# Patient Record
Sex: MALE | Race: WHITE | NOT HISPANIC OR LATINO | Employment: UNEMPLOYED | URBAN - NONMETROPOLITAN AREA
[De-identification: names, ages, dates, MRNs, and addresses within clinical notes are randomized per-mention and may not be internally consistent; named-entity substitution may affect disease eponyms.]

---

## 2022-03-05 ENCOUNTER — HOSPITAL ENCOUNTER (EMERGENCY)
Facility: HOSPITAL | Age: 22
Discharge: PSYCHIATRIC OR SUBSTANCE USE FACILITY | End: 2022-03-06
Attending: EMERGENCY MEDICINE | Admitting: STUDENT IN AN ORGANIZED HEALTH CARE EDUCATION/TRAINING PROGRAM

## 2022-03-05 ENCOUNTER — APPOINTMENT (OUTPATIENT)
Dept: GENERAL RADIOLOGY | Facility: HOSPITAL | Age: 22
End: 2022-03-05

## 2022-03-05 DIAGNOSIS — F32.A DEPRESSION WITH SUICIDAL IDEATION: ICD-10-CM

## 2022-03-05 DIAGNOSIS — S51.812A LACERATION OF LEFT FOREARM, INITIAL ENCOUNTER: Primary | ICD-10-CM

## 2022-03-05 DIAGNOSIS — R45.851 DEPRESSION WITH SUICIDAL IDEATION: ICD-10-CM

## 2022-03-05 LAB
BASOPHILS # BLD AUTO: 0.13 10*3/MM3 (ref 0–0.2)
BASOPHILS NFR BLD AUTO: 0.9 % (ref 0–1.5)
DEPRECATED RDW RBC AUTO: 44 FL (ref 37–54)
EOSINOPHIL # BLD AUTO: 0.42 10*3/MM3 (ref 0–0.4)
EOSINOPHIL NFR BLD AUTO: 3.1 % (ref 0.3–6.2)
ERYTHROCYTE [DISTWIDTH] IN BLOOD BY AUTOMATED COUNT: 13.8 % (ref 12.3–15.4)
HCT VFR BLD AUTO: 49.5 % (ref 37.5–51)
HGB BLD-MCNC: 17.1 G/DL (ref 13–17.7)
IMM GRANULOCYTES # BLD AUTO: 0.07 10*3/MM3 (ref 0–0.05)
IMM GRANULOCYTES NFR BLD AUTO: 0.5 % (ref 0–0.5)
LYMPHOCYTES # BLD AUTO: 2.36 10*3/MM3 (ref 0.7–3.1)
LYMPHOCYTES NFR BLD AUTO: 17.2 % (ref 19.6–45.3)
MCH RBC QN AUTO: 30.3 PG (ref 26.6–33)
MCHC RBC AUTO-ENTMCNC: 34.5 G/DL (ref 31.5–35.7)
MCV RBC AUTO: 87.6 FL (ref 79–97)
MONOCYTES # BLD AUTO: 0.91 10*3/MM3 (ref 0.1–0.9)
MONOCYTES NFR BLD AUTO: 6.6 % (ref 5–12)
NEUTROPHILS NFR BLD AUTO: 71.7 % (ref 42.7–76)
NEUTROPHILS NFR BLD AUTO: 9.84 10*3/MM3 (ref 1.7–7)
NRBC BLD AUTO-RTO: 0 /100 WBC (ref 0–0.2)
PLATELET # BLD AUTO: 327 10*3/MM3 (ref 140–450)
PMV BLD AUTO: 10.1 FL (ref 6–12)
RBC # BLD AUTO: 5.65 10*6/MM3 (ref 4.14–5.8)
WBC NRBC COR # BLD: 13.73 10*3/MM3 (ref 3.4–10.8)

## 2022-03-05 PROCEDURE — 90715 TDAP VACCINE 7 YRS/> IM: CPT | Performed by: EMERGENCY MEDICINE

## 2022-03-05 PROCEDURE — 80179 DRUG ASSAY SALICYLATE: CPT | Performed by: EMERGENCY MEDICINE

## 2022-03-05 PROCEDURE — 25010000002 TETANUS-DIPHTH-ACELL PERTUSSIS 5-2.5-18.5 LF-MCG/0.5 SUSPENSION PREFILLED SYRINGE: Performed by: EMERGENCY MEDICINE

## 2022-03-05 PROCEDURE — 80050 GENERAL HEALTH PANEL: CPT | Performed by: EMERGENCY MEDICINE

## 2022-03-05 PROCEDURE — 36415 COLL VENOUS BLD VENIPUNCTURE: CPT

## 2022-03-05 PROCEDURE — 80306 DRUG TEST PRSMV INSTRMNT: CPT | Performed by: EMERGENCY MEDICINE

## 2022-03-05 PROCEDURE — 99285 EMERGENCY DEPT VISIT HI MDM: CPT

## 2022-03-05 PROCEDURE — 84439 ASSAY OF FREE THYROXINE: CPT | Performed by: PSYCHIATRY & NEUROLOGY

## 2022-03-05 PROCEDURE — 82077 ASSAY SPEC XCP UR&BREATH IA: CPT | Performed by: EMERGENCY MEDICINE

## 2022-03-05 PROCEDURE — 80143 DRUG ASSAY ACETAMINOPHEN: CPT | Performed by: EMERGENCY MEDICINE

## 2022-03-05 PROCEDURE — 73090 X-RAY EXAM OF FOREARM: CPT

## 2022-03-05 PROCEDURE — 87636 SARSCOV2 & INF A&B AMP PRB: CPT | Performed by: EMERGENCY MEDICINE

## 2022-03-05 PROCEDURE — 81001 URINALYSIS AUTO W/SCOPE: CPT | Performed by: EMERGENCY MEDICINE

## 2022-03-05 PROCEDURE — 90471 IMMUNIZATION ADMIN: CPT | Performed by: EMERGENCY MEDICINE

## 2022-03-05 RX ORDER — LIDOCAINE HYDROCHLORIDE AND EPINEPHRINE 10; 10 MG/ML; UG/ML
10 INJECTION, SOLUTION INFILTRATION; PERINEURAL ONCE
Status: COMPLETED | OUTPATIENT
Start: 2022-03-05 | End: 2022-03-05

## 2022-03-05 RX ORDER — DIAPER,BRIEF,INFANT-TODD,DISP
1 EACH MISCELLANEOUS ONCE
Status: COMPLETED | OUTPATIENT
Start: 2022-03-05 | End: 2022-03-06

## 2022-03-05 RX ADMIN — LIDOCAINE HYDROCHLORIDE AND EPINEPHRINE 10 ML: 10; 10 INJECTION, SOLUTION INFILTRATION; PERINEURAL at 23:49

## 2022-03-05 RX ADMIN — TETANUS TOXOID, REDUCED DIPHTHERIA TOXOID AND ACELLULAR PERTUSSIS VACCINE, ADSORBED 0.5 ML: 5; 2.5; 8; 8; 2.5 SUSPENSION INTRAMUSCULAR at 23:05

## 2022-03-06 ENCOUNTER — HOSPITAL ENCOUNTER (INPATIENT)
Facility: HOSPITAL | Age: 22
LOS: 5 days | Discharge: HOME | End: 2022-03-11
Attending: PSYCHIATRY & NEUROLOGY | Admitting: PSYCHIATRY & NEUROLOGY

## 2022-03-06 VITALS
DIASTOLIC BLOOD PRESSURE: 77 MMHG | BODY MASS INDEX: 25.11 KG/M2 | WEIGHT: 160 LBS | HEIGHT: 67 IN | OXYGEN SATURATION: 97 % | HEART RATE: 102 BPM | RESPIRATION RATE: 16 BRPM | TEMPERATURE: 96.9 F | SYSTOLIC BLOOD PRESSURE: 127 MMHG

## 2022-03-06 PROBLEM — F32.A DEPRESSION: Status: ACTIVE | Noted: 2022-03-06

## 2022-03-06 PROBLEM — F60.3 BORDERLINE PERSONALITY DISORDER: Status: ACTIVE | Noted: 2022-03-06

## 2022-03-06 PROBLEM — F43.10 POST TRAUMATIC STRESS DISORDER (PTSD): Status: ACTIVE | Noted: 2022-03-06

## 2022-03-06 PROBLEM — F39 AFFECTIVE DISORDER: Status: ACTIVE | Noted: 2022-03-06

## 2022-03-06 PROBLEM — Z72.89 SELF-INJURIOUS BEHAVIOR: Status: ACTIVE | Noted: 2022-03-06

## 2022-03-06 LAB
ALBUMIN SERPL-MCNC: 5.1 G/DL (ref 3.5–5.2)
ALBUMIN/GLOB SERPL: 1.9 G/DL
ALP SERPL-CCNC: 105 U/L (ref 39–117)
ALT SERPL W P-5'-P-CCNC: 16 U/L (ref 1–41)
AMPHET+METHAMPHET UR QL: POSITIVE
AMPHETAMINES UR QL: POSITIVE
ANION GAP SERPL CALCULATED.3IONS-SCNC: 14 MMOL/L (ref 5–15)
APAP SERPL-MCNC: <5 MCG/ML (ref 0–30)
AST SERPL-CCNC: 22 U/L (ref 1–40)
BACTERIA UR QL AUTO: ABNORMAL /HPF
BARBITURATES UR QL SCN: NEGATIVE
BENZODIAZ UR QL SCN: NEGATIVE
BILIRUB SERPL-MCNC: 0.4 MG/DL (ref 0–1.2)
BILIRUB UR QL STRIP: NEGATIVE
BUN SERPL-MCNC: 12 MG/DL (ref 6–20)
BUN/CREAT SERPL: 10.6 (ref 7–25)
BUPRENORPHINE SERPL-MCNC: NEGATIVE NG/ML
CALCIUM SPEC-SCNC: 9.3 MG/DL (ref 8.6–10.5)
CANNABINOIDS SERPL QL: POSITIVE
CHLORIDE SERPL-SCNC: 104 MMOL/L (ref 98–107)
CLARITY UR: ABNORMAL
CO2 SERPL-SCNC: 24 MMOL/L (ref 22–29)
COCAINE UR QL: NEGATIVE
COLOR UR: YELLOW
CREAT SERPL-MCNC: 1.13 MG/DL (ref 0.76–1.27)
EGFRCR SERPLBLD CKD-EPI 2021: 94.8 ML/MIN/1.73
ETHANOL BLD-MCNC: 81 MG/DL (ref 0–10)
ETHANOL UR QL: 0.08 %
FLUAV RNA RESP QL NAA+PROBE: NOT DETECTED
FLUBV RNA RESP QL NAA+PROBE: NOT DETECTED
GLOBULIN UR ELPH-MCNC: 2.7 GM/DL
GLUCOSE SERPL-MCNC: 60 MG/DL (ref 65–99)
GLUCOSE UR STRIP-MCNC: NEGATIVE MG/DL
HGB UR QL STRIP.AUTO: NEGATIVE
HYALINE CASTS UR QL AUTO: ABNORMAL /LPF
KETONES UR QL STRIP: ABNORMAL
LEUKOCYTE ESTERASE UR QL STRIP.AUTO: ABNORMAL
METHADONE UR QL SCN: NEGATIVE
NITRITE UR QL STRIP: NEGATIVE
OPIATES UR QL: NEGATIVE
OXYCODONE UR QL SCN: NEGATIVE
PCP UR QL SCN: NEGATIVE
PH UR STRIP.AUTO: 6 [PH] (ref 5–9)
POTASSIUM SERPL-SCNC: 3.6 MMOL/L (ref 3.5–5.2)
PROPOXYPH UR QL: NEGATIVE
PROT SERPL-MCNC: 7.8 G/DL (ref 6–8.5)
PROT UR QL STRIP: ABNORMAL
RBC # UR STRIP: ABNORMAL /HPF
REF LAB TEST METHOD: ABNORMAL
SALICYLATES SERPL-MCNC: <0.3 MG/DL
SARS-COV-2 RNA RESP QL NAA+PROBE: NOT DETECTED
SODIUM SERPL-SCNC: 142 MMOL/L (ref 136–145)
SP GR UR STRIP: 1.02 (ref 1–1.03)
SQUAMOUS #/AREA URNS HPF: ABNORMAL /HPF
T4 FREE SERPL-MCNC: 1.61 NG/DL (ref 0.93–1.7)
TRICYCLICS UR QL SCN: NEGATIVE
TSH SERPL DL<=0.05 MIU/L-ACNC: 1.37 UIU/ML (ref 0.27–4.2)
UROBILINOGEN UR QL STRIP: ABNORMAL
WBC # UR STRIP: ABNORMAL /HPF

## 2022-03-06 PROCEDURE — 90792 PSYCH DIAG EVAL W/MED SRVCS: CPT | Performed by: PSYCHIATRY & NEUROLOGY

## 2022-03-06 PROCEDURE — 93005 ELECTROCARDIOGRAM TRACING: CPT | Performed by: STUDENT IN AN ORGANIZED HEALTH CARE EDUCATION/TRAINING PROGRAM

## 2022-03-06 PROCEDURE — 93010 ELECTROCARDIOGRAM REPORT: CPT | Performed by: INTERNAL MEDICINE

## 2022-03-06 RX ORDER — LOPERAMIDE HYDROCHLORIDE 2 MG/1
2 CAPSULE ORAL
Status: DISCONTINUED | OUTPATIENT
Start: 2022-03-06 | End: 2022-03-11 | Stop reason: HOSPADM

## 2022-03-06 RX ORDER — ARIPIPRAZOLE 2 MG/1
2 TABLET ORAL DAILY
Status: COMPLETED | OUTPATIENT
Start: 2022-03-06 | End: 2022-03-06

## 2022-03-06 RX ORDER — CLONIDINE HYDROCHLORIDE 0.1 MG/1
0.1 TABLET ORAL EVERY 6 HOURS PRN
Status: DISCONTINUED | OUTPATIENT
Start: 2022-03-06 | End: 2022-03-11 | Stop reason: HOSPADM

## 2022-03-06 RX ORDER — OLANZAPINE 5 MG/1
10 TABLET, ORALLY DISINTEGRATING ORAL ONCE
Status: COMPLETED | OUTPATIENT
Start: 2022-03-06 | End: 2022-03-06

## 2022-03-06 RX ORDER — HYDROXYZINE PAMOATE 50 MG/1
50 CAPSULE ORAL EVERY 6 HOURS PRN
Status: DISCONTINUED | OUTPATIENT
Start: 2022-03-06 | End: 2022-03-11 | Stop reason: HOSPADM

## 2022-03-06 RX ORDER — ALUMINA, MAGNESIA, AND SIMETHICONE 2400; 2400; 240 MG/30ML; MG/30ML; MG/30ML
15 SUSPENSION ORAL EVERY 6 HOURS PRN
Status: DISCONTINUED | OUTPATIENT
Start: 2022-03-06 | End: 2022-03-11 | Stop reason: HOSPADM

## 2022-03-06 RX ORDER — TRAZODONE HYDROCHLORIDE 50 MG/1
50 TABLET ORAL NIGHTLY PRN
Status: DISCONTINUED | OUTPATIENT
Start: 2022-03-06 | End: 2022-03-11 | Stop reason: HOSPADM

## 2022-03-06 RX ORDER — ACETAMINOPHEN 325 MG/1
650 TABLET ORAL EVERY 4 HOURS PRN
Status: DISCONTINUED | OUTPATIENT
Start: 2022-03-06 | End: 2022-03-11 | Stop reason: HOSPADM

## 2022-03-06 RX ORDER — SERTRALINE HYDROCHLORIDE 25 MG/1
25 TABLET, FILM COATED ORAL DAILY
Status: COMPLETED | OUTPATIENT
Start: 2022-03-06 | End: 2022-03-06

## 2022-03-06 RX ORDER — ARIPIPRAZOLE 5 MG/1
5 TABLET ORAL DAILY
Status: DISCONTINUED | OUTPATIENT
Start: 2022-03-07 | End: 2022-03-07

## 2022-03-06 RX ADMIN — TRAZODONE HYDROCHLORIDE 50 MG: 50 TABLET ORAL at 20:09

## 2022-03-06 RX ADMIN — ARIPIPRAZOLE 2 MG: 2 TABLET ORAL at 18:08

## 2022-03-06 RX ADMIN — BACITRACIN 1 APPLICATION: 500 OINTMENT TOPICAL at 00:28

## 2022-03-06 RX ADMIN — OLANZAPINE 10 MG: 5 TABLET, ORALLY DISINTEGRATING ORAL at 03:06

## 2022-03-06 RX ADMIN — SERTRALINE HYDROCHLORIDE 25 MG: 25 TABLET ORAL at 18:09
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
## 2022-03-06 NOTE — PLAN OF CARE
Goal Outcome Evaluation:  Plan of Care Reviewed With: patient  Patient Agreement with Plan of Care: agrees     Progress: no change  Outcome Evaluation: Patient has been sleeping majority of the day. Has been staying in his room. No issues voiced at this time.
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
## 2022-03-06 NOTE — CONSULTS
[facility] Medicine Consult  Inpatient Hospitalist Consult  Consult performed by: Fred Lui APRN  Consult ordered by: Lavelle Lisa MD          Date of Admission: 3/6/2022  Date of Consult: 03/06/22    Primary Care Physician: Provider, No Known  Referring Physician:  Lavelle Lisa MD      Chief Complaint/Reason for Consultation: Medical co-management, depression    HPI:  This is a 22 year old male with a history of depression who is admitted for depression.  He denies any acute complaints.     Past Medical History:   Past Medical History:   Diagnosis Date   • Asthma    • Depression    • Substance abuse (HCC)    • Suicide attempt (HCC)        Past Surgical History: History reviewed. No pertinent surgical history.  Denies any surgical history.     Family History: Negative for cancer, CVA, or MI.     Social History:   Social History     Socioeconomic History   • Marital status: Single   Tobacco Use   • Smoking status: Current Every Day Smoker     Packs/day: 0.50     Types: Cigarettes   Substance and Sexual Activity   • Alcohol use: Yes   • Drug use: Yes     Types: Methamphetamines, Marijuana   • Sexual activity: Defer       Allergies: No Known Allergies    Medications:   Current Facility-Administered Medications:   •  acetaminophen (TYLENOL) tablet 650 mg, 650 mg, Oral, Q4H PRN, Lavelle Lisa MD  •  aluminum-magnesium hydroxide-simethicone (MAALOX MAX) 400-400-40 MG/5ML suspension 15 mL, 15 mL, Oral, Q6H PRN, Lavelle Lisa MD  •  cloNIDine (CATAPRES) tablet 0.1 mg, 0.1 mg, Oral, Q6H PRN, Lavelle Lisa MD  •  hydrOXYzine pamoate (VISTARIL) capsule 50 mg, 50 mg, Oral, Q6H PRN, Lavelle Lisa MD  •  influenza vac split quad (FLUZONE,FLUARIX,AFLURIA,FLULAVAL) injection 0.5 mL, 0.5 mL, Intramuscular, During Hospitalization, Lavelle Lisa MD  •  loperamide (IMODIUM) capsule 2 mg, 2 mg, Oral, Q2H PRN, Sloan  MD Lavelle  •  magnesium hydroxide (MILK OF MAGNESIA) suspension 10 mL, 10 mL, Oral, Daily PRN, Lavelle Lisa MD  •  nicotine polacrilex (NICORETTE) gum 2 mg, 2 mg, Mouth/Throat, Q1H PRN, Lavelle Lisa MD  •  traZODone (DESYREL) tablet 50 mg, 50 mg, Oral, Nightly PRN, Lavelle Lisa MD    Review of Systems:  Review of Systems   Constitutional: Negative for appetite change, chills, fatigue and fever.   HENT: Negative for congestion.    Eyes: Negative for visual disturbance.   Respiratory: Negative for cough, chest tightness, shortness of breath and wheezing.    Cardiovascular: Negative for chest pain, palpitations and leg swelling.   Gastrointestinal: Negative for abdominal distention, abdominal pain, diarrhea, nausea and vomiting.   Genitourinary: Negative for difficulty urinating and dysuria.   Musculoskeletal: Negative for arthralgias, back pain, myalgias and neck pain.   Skin: Negative for rash and wound.   Neurological: Negative for dizziness, syncope, weakness, light-headedness, numbness and headaches.   All other systems reviewed and are negative.     Otherwise complete ROS is negative except as mentioned above.    Physical Exam:   Temp:  [96.9 °F (36.1 °C)-98.1 °F (36.7 °C)] 97.5 °F (36.4 °C)  Heart Rate:  [] 98  Resp:  [16-20] 20  BP: (125-151)/(73-96) 131/73  Physical Exam  Vitals reviewed.   Constitutional:       General: He is not in acute distress.     Appearance: Normal appearance. He is well-developed. He is not diaphoretic.   HENT:      Head: Normocephalic and atraumatic.   Eyes:      Conjunctiva/sclera: Conjunctivae normal.   Cardiovascular:      Rate and Rhythm: Normal rate and regular rhythm.      Heart sounds: No murmur heard.    No friction rub. No gallop.   Pulmonary:      Effort: Pulmonary effort is normal. No respiratory distress.      Breath sounds: Normal breath sounds. No wheezing or rales.   Chest:      Chest wall: No tenderness.   Abdominal:      General: Bowel  sounds are normal. There is no distension.      Palpations: Abdomen is soft.      Tenderness: There is no abdominal tenderness.   Musculoskeletal:         General: No swelling or deformity.      Cervical back: Normal range of motion and neck supple.   Skin:     General: Skin is warm and dry.      Findings: No erythema.   Neurological:      General: No focal deficit present.      Mental Status: He is alert and oriented to person, place, and time.      Comments: CN I: Sense of smell intact  CN II: Visual fields intact  CN III,IV,VI: extraocular movements intact  CN V: [place] strength and sensation in all three divisions intact  CN VII: Smile and eyelid closure symmetrical  CN VIII: Hearing intact  CN IX and X: Voice and palate movement intact  CN XI: Shoulder shrug intact  CN XII: Tongue protrusion and movement intact             Results Reviewed:  I have personally reviewed current lab, radiology, and data and agree with results.  Lab Results (last 24 hours)     ** No results found for the last 24 hours. **        Imaging Results (Last 24 Hours)     ** No results found for the last 24 hours. **          Assessment:    Depression            Recommendations:  1. Psychiatric management per primary team    Will sign off.  Please do not hesitate to call with questions or changes in the patients condition. Thank you.    I confirmed that the patient's Advance Care Plan is present, code status is documented, or surrogate decision maker is listed in the patient's medical record.     I have utilized all available immediate resources to obtain, update, or review the patient's current medications.     Fred Lui, APRN  03/06/22  11:32 CST

## 2022-03-06 NOTE — ED PROVIDER NOTES
Subjective   21yo male presents ED via EMS c/o suicidal thoughts with intentional laceration volar left forearm with hunting knife prior to arrival.  Pt denies HI/AV hallucination.  Pt endorses etoh/illicit drug use.  ROS otherwise unremarkable.      History provided by:  Patient  Mental Health Problem  Presenting symptoms: depression, self-mutilation and suicidal thoughts    Presenting symptoms: no hallucinations and no homicidal ideas        Review of Systems   Unable to perform ROS: Psychiatric disorder   Constitutional: Negative.    HENT: Negative.    Respiratory: Negative.    Cardiovascular: Negative.    Skin: Positive for wound.   Psychiatric/Behavioral: Positive for self-injury and suicidal ideas. Negative for hallucinations and homicidal ideas.       Past Medical History:   Diagnosis Date   • Asthma        No Known Allergies    History reviewed. No pertinent surgical history.    History reviewed. No pertinent family history.    Social History     Socioeconomic History   • Marital status: Single   Tobacco Use   • Smoking status: Current Every Day Smoker     Packs/day: 0.50     Types: Cigarettes   Substance and Sexual Activity   • Alcohol use: Yes   • Drug use: Not Currently           Objective   Physical Exam  Vitals and nursing note reviewed.   Constitutional:       Appearance: Normal appearance.   HENT:      Head: Normocephalic and atraumatic.      Mouth/Throat:      Mouth: Mucous membranes are moist.   Eyes:      Pupils: Pupils are equal, round, and reactive to light.   Cardiovascular:      Rate and Rhythm: Normal rate and regular rhythm.      Pulses: Normal pulses.      Heart sounds: Normal heart sounds. No murmur heard.    No friction rub. No gallop.   Pulmonary:      Effort: Pulmonary effort is normal. No respiratory distress.      Breath sounds: Normal breath sounds. No wheezing, rhonchi or rales.   Abdominal:      General: Abdomen is flat. Bowel sounds are normal. There is no distension.       Palpations: Abdomen is soft.      Tenderness: There is no abdominal tenderness. There is no guarding or rebound.   Musculoskeletal:        Arms:       Cervical back: Normal range of motion and neck supple. No rigidity.   Lymphadenopathy:      Cervical: No cervical adenopathy.   Neurological:      General: No focal deficit present.      Mental Status: He is alert and oriented to person, place, and time.      GCS: GCS eye subscore is 4. GCS verbal subscore is 5. GCS motor subscore is 6.   Psychiatric:         Attention and Perception: Attention normal.         Mood and Affect: Mood is depressed.         Speech: Speech normal.         Behavior: Behavior is cooperative.         Thought Content: Thought content includes suicidal ideation. Thought content does not include homicidal ideation. Thought content includes suicidal plan.         Cognition and Memory: Cognition normal.         Judgment: Judgment is impulsive and inappropriate.         Laceration Repair    Date/Time: 3/5/2022 11:01 PM  Performed by: Hoang Maldonado MD  Authorized by: Hoang Maldonado MD     Consent:     Consent obtained:  Verbal    Consent given by:  Patient  Anesthesia:     Anesthesia method:  Local infiltration    Local anesthetic:  Lidocaine 1% w/o epi  Laceration details:     Length (cm):  6  Exploration:     Wound exploration: entire depth of wound visualized      Wound extent: no nerve damage noted, no tendon damage noted, no underlying fracture noted and no vascular damage noted    Treatment:     Area cleansed with:  Chlorhexidine and saline    Amount of cleaning:  Standard    Debridement:  None  Skin repair:     Repair method:  Sutures    Suture size:  4-0    Suture material:  Nylon    Suture technique:  Running    Number of sutures:  11  Approximation:     Approximation:  Close  Post-procedure details:     Dressing:  Antibiotic ointment and bulky dressing    Procedure completion:  Tolerated well, no immediate complications    Laceration  Repair    Date/Time: 3/6/2022 12:00 AM  Performed by: Hoang Maldonado MD  Authorized by: Hoang Maldonado MD     Consent:     Consent obtained:  Verbal  Anesthesia:     Anesthesia method:  Local infiltration    Local anesthetic:  Lidocaine 1% WITH epi  Pre-procedure details:     Preparation:  Patient was prepped and draped in usual sterile fashion and imaging obtained to evaluate for foreign bodies  Exploration:     Wound exploration: entire depth of wound visualized      Wound extent: no nerve damage noted, no tendon damage noted, no underlying fracture noted and no vascular damage noted      Contaminated: no    Treatment:     Area cleansed with:  Saline and chlorhexidine    Amount of cleaning:  Standard    Irrigation solution:  Sterile saline    Debridement:  None  Skin repair:     Repair method:  Sutures    Suture size:  4-0    Suture material:  Nylon    Suture technique:  Running    Number of sutures:  9  Approximation:     Approximation:  Close  Repair type:     Repair type:  Simple  Post-procedure details:     Dressing:  Antibiotic ointment, bulky dressing and non-adherent dressing    Procedure completion:  Tolerated well, no immediate complications               ED Course  ED Course as of 03/06/22 0141   Sun Mar 06, 2022   0000 Checkout Dr. Carrizales. Pending labs/psych consult/disposition. [SD]   0141 Patient checked out to me by Dr. Maldonado.  Patient medically cleared.  Evaluated by psych.  Transfer to the behavioral health unit. [BH]      ED Course User Index  [BH] Rui Carrizales MD  [SD] Hoang Maldonado MD      Labs Reviewed   COMPREHENSIVE METABOLIC PANEL - Abnormal; Notable for the following components:       Result Value    Glucose 60 (*)     All other components within normal limits    Narrative:     GFR Normal >60  Chronic Kidney Disease <60  Kidney Failure <15     URINALYSIS W/ MICROSCOPIC IF INDICATED (NO CULTURE) - Abnormal; Notable for the following components:    Appearance, UA Cloudy (*)      Ketones, UA Trace (*)     Protein, UA 30 mg/dL (1+) (*)     Leuk Esterase, UA Small (1+) (*)     All other components within normal limits   ETHANOL - Abnormal; Notable for the following components:    Ethanol 81 (*)     All other components within normal limits   URINE DRUG SCREEN - Abnormal; Notable for the following components:    THC, Screen, Urine Positive (*)     Methamphetamine, Ur Positive (*)     Amphetamine Screen, Urine Positive (*)     All other components within normal limits    Narrative:     Cutoff For Drugs Screened:    Amphetamines               500 ng/ml  Barbiturates               200 ng/ml  Benzodiazepines            150 ng/ml  Cocaine                    150 ng/ml  Methadone                  200 ng/ml  Opiates                    100 ng/ml  Phencyclidine               25 ng/ml  THC                            50 ng/ml  Methamphetamine            500 ng/ml  Tricyclic Antidepressants  300 ng/ml  Oxycodone                  100 ng/ml  Propoxyphene               300 ng/ml  Buprenorphine               10 ng/ml    The normal value for all drugs tested is negative. This report includes unconfirmed screening results, with the cutoff values listed, to be used for medical treatment purposes only.  Unconfirmed results must not be used for non-medical purposes such as employment or legal testing.  Clinical consideration should be applied to any drug of abuse test, particularly when unconfirmed results are used.     CBC WITH AUTO DIFFERENTIAL - Abnormal; Notable for the following components:    WBC 13.73 (*)     Lymphocyte % 17.2 (*)     Neutrophils, Absolute 9.84 (*)     Monocytes, Absolute 0.91 (*)     Eosinophils, Absolute 0.42 (*)     Immature Grans, Absolute 0.07 (*)     All other components within normal limits   URINALYSIS, MICROSCOPIC ONLY - Abnormal; Notable for the following components:    RBC, UA 0-2 (*)     WBC, UA 13-20 (*)     All other components within normal limits   COVID-19 AND FLU A/B, NP  SWAB IN TRANSPORT MEDIA 8-12 HR TAT - Normal    Narrative:     Fact sheet for providers: https://www.fda.gov/media/723403/download    Fact sheet for patients: https://www.fda.gov/media/389363/download    Test performed by PCR.   SALICYLATE LEVEL - Normal   ACETAMINOPHEN LEVEL - Normal   CBC AND DIFFERENTIAL    Narrative:     The following orders were created for panel order CBC & Differential.  Procedure                               Abnormality         Status                     ---------                               -----------         ------                     CBC Auto Differential[[id number]]         Abnormal            Final result                 Please view results for these tests on the individual orders.     XR Forearm 2 View Left    Result Date: 3/5/2022  Narrative: EXAM DESCRIPTION: XR FOREARM 2 VW LEFT CLINICAL HISTORY: trauma COMPARISON: None TECHNIQUE: 2 views of the left forearm. FINDINGS: There is no acute fracture or dislocation. Bone mineralization is within normal limits. Joint spaces are preserved. Soft tissues are unremarkable. . There is no radiopaque foreign body material.     Impression: No acute osseous abnormality Electronically signed by:  Oli Stout MD  3/5/2022 10:52 PM [facility] Workstation: [id number]                                               [facility]    Final diagnoses:   Laceration of left forearm, initial encounter   Depression with suicidal ideation       ED Disposition  ED Disposition     ED Disposition   DC/Transfer to Behavioral Health Condition   Stable    Comment   --             No follow-up provider specified.       Medication List      No changes were made to your prescriptions during this visit.          Rui Carrizales MD  03/06/22 0141
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
## 2022-03-06 NOTE — NURSING NOTE
Behavior   Note any precipitants to event or behavior   Describe level and action of any aggressive behavior or speech and associated interventions.     Anxiety: Restless/Edgy  Depression: depressed mood  Pain  0  AVH   no  S/I   no  Plan  no  H/I   no  Plan  no    Affect   blunted      Note: Patient in personal room during assessment. He was asleep and had to be woken up. Patient states that he is feeling better this morning but has some ringing in his ears. Patient repeatedly falls asleep while answering questions but denies SI/HI/AVH.       Intervention    PRN medication utilized:  no    Instructed in medication usage and effects  Medications administered as ordered  Encouraged to verbalize needs      Response    Verbalized understanding   Did patient take medications as ordered no scheduled meds at this time  Did patient interact with assessment?  minimally    Plan    Will monitor for safety  Will monitor every 15 minutes as ordered  Will evaluate and promote the plan of care    Last BM:  unknown date  (Please chart in I/O as well)

## 2022-03-06 NOTE — ED NOTES
"Pt presents to the ED via EMS with deep laceration to the left forearm. Pt states he has a history of cutting. Pt states he cuts himself for a release, not with the intent of suicide. Pt states he has had suicidal thoughts and has them often. Pt states he has thought of a plan but does not intend on doing it now. Pt states \" I would have a canvas behind me and probably use a gun to show everyone whats going on inside my head because they would understand then\" Pt teary eyed while explaining his plan. Pt very cooperative at this time.      Charlotte Zarco, RN  03/05/22 0346    "
No

## 2022-03-07 PROBLEM — F22 PARANOIA: Status: ACTIVE | Noted: 2022-03-07

## 2022-03-07 LAB
25(OH)D3 SERPL-MCNC: 11.6 NG/ML (ref 30–100)
CHOLEST SERPL-MCNC: 144 MG/DL (ref 0–200)
FOLATE SERPL-MCNC: 6.03 NG/ML (ref 4.78–24.2)
GLUCOSE P FAST SERPL-MCNC: 106 MG/DL (ref 74–106)
HDLC SERPL-MCNC: 42 MG/DL (ref 40–60)
LDLC SERPL CALC-MCNC: 84 MG/DL (ref 0–100)
LDLC/HDLC SERPL: 1.96 {RATIO}
TRIGL SERPL-MCNC: 98 MG/DL (ref 0–150)
VIT B12 BLD-MCNC: 293 PG/ML (ref 211–946)
VLDLC SERPL-MCNC: 18 MG/DL (ref 5–40)
WHOLE BLOOD HOLD SPECIMEN: NORMAL

## 2022-03-07 PROCEDURE — 99232 SBSQ HOSP IP/OBS MODERATE 35: CPT | Performed by: PSYCHIATRY & NEUROLOGY

## 2022-03-07 PROCEDURE — 82607 VITAMIN B-12: CPT | Performed by: PSYCHIATRY & NEUROLOGY

## 2022-03-07 PROCEDURE — 82306 VITAMIN D 25 HYDROXY: CPT | Performed by: PSYCHIATRY & NEUROLOGY

## 2022-03-07 PROCEDURE — 80061 LIPID PANEL: CPT | Performed by: PSYCHIATRY & NEUROLOGY

## 2022-03-07 PROCEDURE — 82947 ASSAY GLUCOSE BLOOD QUANT: CPT | Performed by: PSYCHIATRY & NEUROLOGY

## 2022-03-07 PROCEDURE — 82746 ASSAY OF FOLIC ACID SERUM: CPT | Performed by: PSYCHIATRY & NEUROLOGY

## 2022-03-07 RX ORDER — ARIPIPRAZOLE 10 MG/1
10 TABLET ORAL DAILY
Status: DISCONTINUED | OUTPATIENT
Start: 2022-03-08 | End: 2022-03-07

## 2022-03-07 RX ORDER — ARIPIPRAZOLE 15 MG/1
15 TABLET ORAL NIGHTLY
Status: DISCONTINUED | OUTPATIENT
Start: 2022-03-07 | End: 2022-03-08

## 2022-03-07 RX ADMIN — TRAZODONE HYDROCHLORIDE 50 MG: 50 TABLET ORAL at 20:08

## 2022-03-07 RX ADMIN — OFLOXACIN 50000 UNITS: 300 TABLET, COATED ORAL at 20:41

## 2022-03-07 RX ADMIN — SERTRALINE 50 MG: 50 TABLET, FILM COATED ORAL at 08:23

## 2022-03-07 RX ADMIN — ARIPIPRAZOLE 5 MG: 5 TABLET ORAL at 08:23

## 2022-03-07 RX ADMIN — ARIPIPRAZOLE 15 MG: 15 TABLET ORAL at 20:08

## 2022-03-07 NOTE — NURSING NOTE
Letter faxed to [facility] Clerk [phone](fax) phone number [phone].

## 2022-03-07 NOTE — NURSING NOTE
Behavior   Note any precipitants to event or behavior   Describe level and action of any aggressive behavior or speech and associated interventions.     Anxiety: Excess Worry  Depression: depressed mood  Pain  0  AVH   no  S/I   no  Plan  no  H/I   no  Plan  no    Affect   blunted      Note: Horacio is sitting in the hallway with a peer, he rates anxiety 2/10 and  depression 4-5/10. Sutures to left arm are clean/dry/intact, no signs of infection or drainage, open to air. Denies SI HI and all AVH. Reports stressors are worrying about his family, especially his dad he has not talked too, support offered. Denies issues with sleep and appetite but he did want Trazodone. Encourage coping skills to prevent cutting. Vss      Intervention    PRN medication utilized:  Yes Trazodone    Instructed in medication usage and effects yes  Medications administered as ordered yes  Encouraged to verbalize needs yes      Response    Verbalized understanding yes  Did patient take medications as ordered yes   Did patient interact with assessment?  yes     Plan    Will monitor for safety yes  Will monitor every 15 minutes as ordered yes  Will evaluate and promote the plan of care yes    Last BM:  unknown date  (Please chart in I/O as well)

## 2022-03-07 NOTE — PLAN OF CARE
Problem: Adult Behavioral Health Plan of Care  Goal: Plan of Care Review  Outcome: Ongoing, Progressing  Flowsheets (Taken 3/7/2022 1305)  Consent Given to Review Plan with: Father Dewayne, and Brother Yogesh  Progress: no change  Plan of Care Reviewed With: patient  Patient Agreement with Plan of Care: agrees  Outcome Evaluation: New admit. Completed social history and integrated summary  Goal: Patient-Specific Goal (Individualization)  Outcome: Ongoing, Progressing  Flowsheets  Taken 3/7/2022 1305  Patient-Specific Goals (Include Timeframe): Patient will deny SI/HI prior to discharge. Patient will identify 1 healthy coping skill for stress prior to discharge. Patient will consent to appropriate aftercare plan prior to discharge.  Individualized Care Needs: Therapist will offer 1-4 individual sessions, family education, aftercare planning  Anxieties, Fears or Concerns: None reported  Taken 3/7/2022 1037  Patient Personal Strengths:   expressive of needs   expressive of emotions   family/social support   spiritual/Sikhism support   resourceful  Patient Vulnerabilities:   lacks insight into illness   legal concerns   poor impulse control   substance abuse/addiction  Goal: Optimized Coping Skills in Response to Life Stressors  Outcome: Ongoing, Progressing  Intervention: Promote Effective Coping Strategies  Flowsheets (Taken 3/7/2022 1305)  Supportive Measures:   active listening utilized   counseling provided  Goal: Develops/Participates in Therapeutic [place] to Support Successful Transition  Outcome: Ongoing, Progressing  Intervention: Foster Therapeutic [place]  Flowsheets (Taken 3/7/2022 1305)  Trust Relationship/Rapport:   care explained   questions answered   questions encouraged   choices provided   emotional support provided   reassurance provided   empathic listening provided   thoughts/feelings acknowledged  Intervention: Mutually Develop Transition Plan  Flowsheets  Taken 3/7/2022  1305  Outpatient/Agency/Support Group Needs:   outpatient medication management   outpatient psychiatric care (specify)   outpatient counseling  Transition Support:   community resources reviewed   follow-up care coordinated   follow-up care discussed   crisis management plan promoted   crisis management plan verbalized  Anticipated Discharge Disposition: home with family  Taken 3/7/2022 1107  Discharge Coordination/Progress: Patient has insurance for discharge planning. Patient refuses aftercare referral.  Concerns Comments: NA  Transportation Anticipated: family or friend will provide  Transportation Concerns: no car  Current Discharge Risk:   psychiatric illness   substance use/abuse   legal concerns  Concerns to be Addressed:   mental health   coping/stress   compliance issue   discharge planning   substance/tobacco abuse/use  Readmission Within the Last 30 Days: no previous admission in last 30 days  Patient/Family Anticipated Services at Transition:   outpatient care   mental health services  Patient's Choice of Community Agency(s): Patient refusing  Patient/Family Anticipates Transition to: home with family  Offered/Gave Vendor List: no   Goal Outcome Evaluation:  Plan of Care Reviewed With: patient  Patient Agreement with Plan of Care: agrees  Consent Given to Review Plan with: Father Dewayne, and Brother Yogesh  Progress: no change  Outcome Evaluation: New admit. Completed social history and integrated summary

## 2022-03-07 NOTE — PLAN OF CARE
Goal Outcome Evaluation:  Plan of Care Reviewed With: patient  Patient Agreement with Plan of Care: agrees     Progress: improving  Outcome Evaluation: Patient has slept approx 6.5 hours thus far; he denied SI HI and AVH, sutures to lt arm are well approximated with no signs of infection or darinage, thus they are open to air to promote healing.
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
## 2022-03-07 NOTE — CONSULTS
Adult Nutrition  Assessment    Patient Name:  Horacio Puckett  YOB: 2000  MRN: [id number]  Admit Date:  3/6/2022    Assessment Date:  3/7/2022    Comments:  RD consult r/t MST 2. Admitted to [facility] r/t SI. Hx includes drug abuse. Pt reports he thinks he's lost 10-20# since November---RD unable to confirm w/epic weights. Pt was falling asleep while RD was trying to ask questions. He did tell me he did not want any supplements. Currently eating  100% of regular diet. RD will monitor course and make recs prn.      Reason for Assessment     Row Name 03/07/22 1310          Reason for Assessment    Reason For Assessment identified at risk by screening criteria     Diagnosis psychosocial     Identified At Risk by Screening Criteria MST SCORE 2+                Nutrition/Diet History     Row Name 03/07/22 1310          Nutrition/Diet History    Typical Intake (Food/Fluid/EN/PN) Pt w/difficulty staying awake during RD visit. Says he thinks he's lost 10-20# since November. Does not want any supplements.                Anthropometrics     Row Name 03/07/22 1312          Anthropometrics    Weight for Calculation 68 kg (150 lb)                Labs/Tests/Procedures/Meds     Row Name 03/07/22 1311          Medications    Pertinent Medications Reviewed reviewed                  Estimated/Assessed Needs - Anthropometrics     Row Name 03/07/22 1312          Anthropometrics    Weight for Calculation 68 kg (150 lb)            Estimated/Assessed Needs    Additional Documentation KCAL/KG (Group);Protein Requirements (Group);Fluid Requirements (Group)            KCAL/KG    KCAL/KG 25 Kcal/Kg (kcal);30 Kcal/Kg (kcal)     25 Kcal/Kg (kcal) 1701     30 Kcal/Kg (kcal) 2041.2            Protein Requirements    Weight Used For Protein Calculations 68 kg (150 lb)     Est Protein Requirement Amount (gms/kg) 0.8 gm protein     Estimated Protein Requirements (gms/day) 54.43            Fluid Requirements    Fluid Requirements  (mL/day) 2000     RDA Method (mL) 2000                Nutrition Prescription Ordered     Row Name 03/07/22 1312          Nutrition Prescription PO    Current PO Diet Regular     Fluid Consistency Thin                Evaluation of Received Nutrient/Fluid Intake     Row Name 03/07/22 1312          PO Evaluation    Number of Meals 4     % PO Intake 100                     Electronically signed by:  Jennifer Powell RD  03/07/22 13:16 CST

## 2022-03-07 NOTE — PLAN OF CARE
Goal Outcome Evaluation:  Plan of Care Reviewed With: patient           Outcome Evaluation: Pt eating 100% of regular diet. Reports weight loss, but RD unable to confirm w/recorded weights. Pt does not want supplements. Will monitor.

## 2022-03-07 NOTE — PLAN OF CARE
"Goal Outcome Evaluation:      Treatment team met with patient to discuss and review treatment plan. Pt was encouraged to discuss their reason for admission, as well as their goals for treatment. Team discussed anticipated interventions and treatment modalities that will be used to address goals.Pt given opportunity to discuss any concerns re: treatment plan.  Patient reports thoughts of self harm and \"chaos at home\". Patient states \"they try to make me think I have dementia and that I'm crazy and stuff\".  Team Members present during meeting:    MD:Dr. Lisa  APRN: Elizabeth Barbour  RN: Sobia Cueva  SW: Edouard De La Rosa  RT:Jacki Herrera  Other: Reggie Brown             "

## 2022-03-07 NOTE — PROGRESS NOTES
3/7/2022    Chief Complaint: self injurious behavior and psychosis    Subjective:  Patient is a 22 y.o. male that is currently inpatient on the adult U  Today he is seen individually and in treatment team. Pt states that he lives at home with his father and grandparents. Pt states that he believes his family work against  Him and cause him problems. He states that his grandmother tries to convince him he has dementia  At first pt appears to be reality based, but after further conversation he speaks of paranoia and delusion, primarily regarding family  Pt states that a friend of his father's said he could stay with her at discharge but he is court ordered to stay at family's house.   Pt is difficult to follow at times, he is encouraged to attend groups and interact with peers.  He is compliant with medications.   Pt denies SI/HI/AVH    Objective     Vital Signs    Temp:  [94.3 °F (34.6 °C)-97.2 °F (36.2 °C)] 97.2 °F (36.2 °C)  Heart Rate:  [62-85] 62  Resp:  [18] 18  BP: (131-143)/(61-69) 131/69    Physical Exam:   General Appearance: alert, appears stated age and cooperative,  Hygiene:   fair  Gait & Station: Normal  Musculoskeletal: No tremors or abnormal involuntary movements    Mental Status Exam:   Cooperation:  Cooperative  Eye Contact:  Fair  Psychomotor Behavior:  Appropriate  Mood: Anxious/Nervous  Affect:  mood-congruent  Speech:  Normal  Thought Process:  Doyle  Associations: Tangential  Thought Content:     Mood congruent   Suicidal:  None   Homicidal:  None   Hallucinations:  Not demonstrated today   Delusion:  Paranoid  Cognitive Functioning:   Consciousness: awake, alert and oriented  Reliability:  poor  Insight:  Poor  Judgement:  Impaired  Impulse Control:  Impaired    Lab Results (last 24 hours)     Procedure Component Value Units Date/Time    Extra Tubes [[id number]] Collected: 03/07/22 0531    Specimen: Blood, Venous Line Updated: 03/07/22 0633    Narrative:      The following orders were  created for panel order Extra Tubes.  Procedure                               Abnormality         Status                     ---------                               -----------         ------                     Lavender Top[[id number]]                                     Final result                 Please view results for these tests on the individual orders.    Lavender Top [[id number]] Collected: 03/07/22 0531    Specimen: Blood Updated: 03/07/22 0633     Extra Tube hold for add-on     Comment: Auto resulted       Lipid Panel [[id number]] Collected: 03/07/22 0531    Specimen: Blood Updated: 03/07/22 0622     Total Cholesterol 144 mg/dL      Triglycerides 98 mg/dL      HDL Cholesterol 42 mg/dL      LDL Cholesterol  84 mg/dL      VLDL Cholesterol 18 mg/dL      LDL/HDL Ratio 1.96    Narrative:      Cholesterol Reference Ranges  (U.S. Department of Health and Human Services ATP III Classifications)    Desirable          <200 mg/dL  Borderline High    200-239 mg/dL  High Risk          >240 mg/dL      Triglyceride Reference Ranges  (U.S. Department of Health and Human Services ATP III Classifications)    Normal           <150 mg/dL  Borderline High  150-199 mg/dL  High             200-499 mg/dL  Very High        >500 mg/dL    HDL Reference Ranges  (U.S. Department of Health and Human Services ATP III Classifications)    Low     <40 mg/dl (major risk factor for CHD)  High    >60 mg/dl ('negative' risk factor for CHD)        LDL Reference Ranges  (U.S. Department of Health and Human Services ATP III Classifications)    Optimal          <100 mg/dL  Near Optimal     100-129 mg/dL  Borderline High  130-159 mg/dL  High             160-189 mg/dL  Very High        >189 mg/dL    Glucose, Fasting [[id number]]  (Normal) Collected: 03/07/22 0531    Specimen: Blood Updated: 03/07/22 0622     Glucose, Fasting 106 mg/dL     Folate [[id number]] Collected: 03/07/22 0531    Specimen: Blood Updated: 03/07/22 0558    Vitamin B12 [[id number]]  Collected: 03/07/22 0531    Specimen: Blood Updated: 03/07/22 0558    Vitamin D 25 Hydroxy [[id number]] Collected: 03/07/22 0531    Specimen: Blood Updated: 03/07/22 0558    TSH [[id number]]  (Normal) Collected: 03/05/22 2227    Specimen: Blood Updated: 03/06/22 1829     TSH 1.370 uIU/mL     T4, Free [[id number]]  (Normal) Collected: 03/05/22 2227    Specimen: Blood Updated: 03/06/22 1829     Free T4 1.61 ng/dL     Narrative:      Results may be falsely increased if patient taking Biotin.          Imaging Results (Last 24 Hours)     ** No results found for the last 24 hours. **          Medicine:   Current Facility-Administered Medications:   •  acetaminophen (TYLENOL) tablet 650 mg, 650 mg, Oral, Q4H PRN, Lavelle Lisa MD  •  aluminum-magnesium hydroxide-simethicone (MAALOX MAX) 400-400-40 MG/5ML suspension 15 mL, 15 mL, Oral, Q6H PRN, Lavelle Lisa MD  •  [COMPLETED] ARIPiprazole (ABILIFY) tablet 2 mg, 2 mg, Oral, Daily, 2 mg at 03/06/22 1808 **FOLLOWED BY** ARIPiprazole (ABILIFY) tablet 5 mg, 5 mg, Oral, Daily, Lavelle Lisa MD, 5 mg at 03/07/22 0823  •  cloNIDine (CATAPRES) tablet 0.1 mg, 0.1 mg, Oral, Q6H PRN, Lavelle Lisa MD  •  hydrOXYzine pamoate (VISTARIL) capsule 50 mg, 50 mg, Oral, Q6H PRN, Lavelle Lisa MD  •  influenza vac split quad (FLUZONE,FLUARIX,AFLURIA,FLULAVAL) injection 0.5 mL, 0.5 mL, Intramuscular, During Hospitalization, Lavelle Lisa MD  •  loperamide (IMODIUM) capsule 2 mg, 2 mg, Oral, Q2H PRN, Lavelle Lisa MD  •  magnesium hydroxide (MILK OF MAGNESIA) suspension 10 mL, 10 mL, Oral, Daily PRN, Lavelle Lisa MD  •  nicotine polacrilex (NICORETTE) gum 2 mg, 2 mg, Mouth/Throat, Q1H PRN, Lavelle Lisa MD  •  [COMPLETED] sertraline (ZOLOFT) tablet 25 mg, 25 mg, Oral, Daily, 25 mg at 03/06/22 1809 **FOLLOWED BY** sertraline (ZOLOFT) tablet 50 mg, 50 mg, Oral, Daily, Lavelle Lisa MD, 50 mg at 03/07/22 0823  •  traZODone (DESYREL) tablet  50 mg, 50 mg, Oral, Nightly PRN, Lavelle Lisa MD, 50 mg at 03/06/22 2009    Diagnoses/Assessment:     Depression    Self-injurious behavior    Borderline personality disorder (HCC)    Affective disorder (HCC)    Post traumatic stress disorder (PTSD)      Treatment Plan:    1) Will continue care for the patient on the behavioral health unit at [facility] to ensure patient safety.  2) Will continue to provide treatment with the unit milieu, activities, therapies and psychopharmacological management.  3) Patient to be placed on or continued on  Q15 minute checks  and Aggression precautions.  4) Pertinent medical issues: none  5) Will order following labs: none  6) Will make the following medication changes:   --Increase Abilify to 15 mg nightly  --Increase Zoloft to 100 mg daily  7) Will continue discharge planning as appropriate for patient.  8) Psychotherapy provided for less than 16 minutes.    Treatment plan and medication risks and benefits discussed with: Patient    Elizabeth Barbour, DELANO  03/07/22  12:54 CST

## 2022-03-08 PROCEDURE — 25010000002 HALOPERIDOL LACTATE PER 5 MG: Performed by: PSYCHIATRY & NEUROLOGY

## 2022-03-08 PROCEDURE — 25010000002 DIPHENHYDRAMINE PER 50 MG: Performed by: PSYCHIATRY & NEUROLOGY

## 2022-03-08 PROCEDURE — 25010000002 LORAZEPAM PER 2 MG: Performed by: PSYCHIATRY & NEUROLOGY

## 2022-03-08 PROCEDURE — 99233 SBSQ HOSP IP/OBS HIGH 50: CPT | Performed by: PSYCHIATRY & NEUROLOGY

## 2022-03-08 RX ORDER — DIPHENHYDRAMINE HYDROCHLORIDE 50 MG/ML
50 INJECTION INTRAMUSCULAR; INTRAVENOUS ONCE
Status: COMPLETED | OUTPATIENT
Start: 2022-03-08 | End: 2022-03-08

## 2022-03-08 RX ORDER — HALOPERIDOL 5 MG/ML
5 INJECTION INTRAMUSCULAR ONCE
Status: COMPLETED | OUTPATIENT
Start: 2022-03-08 | End: 2022-03-08

## 2022-03-08 RX ORDER — ARIPIPRAZOLE 10 MG/1
20 TABLET ORAL NIGHTLY
Status: DISCONTINUED | OUTPATIENT
Start: 2022-03-08 | End: 2022-03-11 | Stop reason: HOSPADM

## 2022-03-08 RX ORDER — LORAZEPAM 2 MG/ML
2 INJECTION INTRAMUSCULAR ONCE
Status: COMPLETED | OUTPATIENT
Start: 2022-03-08 | End: 2022-03-08

## 2022-03-08 RX ADMIN — SERTRALINE 100 MG: 50 TABLET, FILM COATED ORAL at 08:18

## 2022-03-08 RX ADMIN — HALOPERIDOL LACTATE 5 MG: 5 INJECTION, SOLUTION INTRAMUSCULAR at 11:10

## 2022-03-08 RX ADMIN — LORAZEPAM 2 MG: 2 INJECTION INTRAMUSCULAR; INTRAVENOUS at 11:07

## 2022-03-08 RX ADMIN — ARIPIPRAZOLE 20 MG: 10 TABLET ORAL at 20:25

## 2022-03-08 RX ADMIN — DIPHENHYDRAMINE HYDROCHLORIDE 50 MG: 50 INJECTION INTRAMUSCULAR; INTRAVENOUS at 11:07

## 2022-03-08 NOTE — PLAN OF CARE
Problem: Adult Behavioral Health Plan of Care  Goal: Plan of Care Review  Outcome: Ongoing, Progressing  Flowsheets (Taken 3/8/2022 1018)  Progress: no change  Plan of Care Reviewed With: patient  Patient Agreement with Plan of Care: unable to participate  Outcome Evaluation: Treatment team staffing.       1015:     Therapist staffed case with Dr. Lisa and treatment team. Patient had a verbal outburst this morning and required staff redirection. Dr. Lisa asked therapist to attempt contact patient's father Dewayne for collateral.  Therapist attempted multiple attempts at [phone];  this phone number is a busy signal and does not ring.      Patient provided verbal consent to involve his brother Yogesh at [phone]; No answer x 2 this date.

## 2022-03-08 NOTE — PROGRESS NOTES
3/8/2022    Chief Complaint: psychosis and anxiety    Subjective:  Patient is a 22 y.o. male that is currently inpatient on the adult U Today he was heard becoming loud and agitated while on a phone call. He was asked to talk calmly, unable to deescalate self at that time.  Pt was yelling, wanting to leave, wanting to talk to father and also making irrational statements.  He apparently became upset after a peer laughed at him regarding how to use the phone.   After some time, pt did calm self on own, went to his room where he received medications.  He made the comment that he wanted the medication to help calm him down.   Pt apologized for behavior and has since been resting.    Pt is compliant with medications.  He is speaking with RN appropriately.    Pt denies SI/HI, does appear to be paranoid.     Objective     Vital Signs    Temp:  [97.9 °F (36.6 °C)-98.2 °F (36.8 °C)] 98.2 °F (36.8 °C)  Heart Rate:  [57-65] 57  Resp:  [18] 18  BP: (108-110)/(58-63) 110/63    Physical Exam:   General Appearance: alert, appears stated age, uncooperative and distress severe,  Hygiene:   fair  Gait & Station: Normal  Musculoskeletal: No tremors or abnormal involuntary movements    Mental Status Exam:   Cooperation:  Suspicious  Eye Contact:  Poor  Psychomotor Behavior:  Aggitated  Mood: Irritable  Affect:  increased in intensity  Speech:  Pressured, Rapid and Rambling  Thought Process:  Incoherent  Associations: Tangential and Disorganized  Thought Content:     Mood congruent   Suicidal:  None   Homicidal:  None   Hallucinations:  None   Delusion:  Paranoid  Cognitive Functioning:   Consciousness: awake, alert and oriented  Reliability:  poor  Insight:  Poor  Judgement:  Impaired  Impulse Control:  Impaired    Lab Results (last 24 hours)     ** No results found for the last 24 hours. **        Imaging Results (Last 24 Hours)     ** No results found for the last 24 hours. **          Medicine:   Current Facility-Administered  Medications:   •  acetaminophen (TYLENOL) tablet 650 mg, 650 mg, Oral, Q4H PRN, Lavelle Lisa MD  •  aluminum-magnesium hydroxide-simethicone (MAALOX MAX) 400-400-40 MG/5ML suspension 15 mL, 15 mL, Oral, Q6H PRN, Lavelle Lisa MD  •  [COMPLETED] ARIPiprazole (ABILIFY) tablet 2 mg, 2 mg, Oral, Daily, 2 mg at 03/06/22 1808 **FOLLOWED BY** ARIPiprazole (ABILIFY) tablet 15 mg, 15 mg, Oral, Nightly, Elizabeth Barbour, APRN, 15 mg at 03/07/22 2008  •  cholecalciferol (VITAMIN D3) capsule 50,000 Units, 50,000 Units, Oral, Q7 Days, Lavelle Lisa MD, 50,000 Units at 03/07/22 2041  •  cloNIDine (CATAPRES) tablet 0.1 mg, 0.1 mg, Oral, Q6H PRN, Lavelle Lisa MD  •  hydrOXYzine pamoate (VISTARIL) capsule 50 mg, 50 mg, Oral, Q6H PRN, Lavelle Lisa MD  •  influenza vac split quad (FLUZONE,FLUARIX,AFLURIA,FLULAVAL) injection 0.5 mL, 0.5 mL, Intramuscular, During Hospitalization, Lavelle Lisa MD  •  loperamide (IMODIUM) capsule 2 mg, 2 mg, Oral, Q2H PRN, Lavelle Lisa MD  •  magnesium hydroxide (MILK OF MAGNESIA) suspension 10 mL, 10 mL, Oral, Daily PRN, Lavelle Lisa MD  •  nicotine polacrilex (NICORETTE) gum 2 mg, 2 mg, Mouth/Throat, Q1H PRN, Lavelle Lisa MD  •  [COMPLETED] sertraline (ZOLOFT) tablet 25 mg, 25 mg, Oral, Daily, 25 mg at 03/06/22 1809 **FOLLOWED BY** sertraline (ZOLOFT) tablet 100 mg, 100 mg, Oral, Daily, BarbourElizabeth E, APRN, 100 mg at 03/08/22 0818  •  traZODone (DESYREL) tablet 50 mg, 50 mg, Oral, Nightly PRN, Lavelle Lisa MD, 50 mg at 03/07/22 2008    Diagnoses/Assessment:     Depression    Self-injurious behavior    Borderline personality disorder (HCC)    Affective disorder (HCC)    Post traumatic stress disorder (PTSD)    Paranoia (HCC)      Treatment Plan:    1) Will continue care for the patient on the behavioral health unit at [facility] to ensure patient safety.  2) Will continue to provide treatment with the unit milieu,  activities, therapies and psychopharmacological management.  3) Patient to be placed on or continued on  Q15 minute checks  and Elopement and Aggression precautions.  4) Pertinent medical issues: none  5) Will order following labs: none  6) Will make the following medication changes:   --Increase Abilify 20 mg daily   7) Will continue discharge planning as appropriate for patient.  8) Psychotherapy provided for less than 16 minutes.    Treatment plan and medication risks and benefits discussed with: Patient and treatment team    DELANO Kohler  03/08/22  14:00 CST

## 2022-03-08 NOTE — PROGRESS NOTES
"1245:    Therapist reached patient's father Dewayne [phone];   \"He called me. Said he was ready to leave. I told him I couldn't. He got a little bit aggressive. I told him he just couldn't come home yet.\"  Dewayne appeared supportive of patient he reports that he has had issues with paranoia at home and thinking people are out to get him. Apparently there was an issue with grandmother's boyfriend claiming him on taxes on years ago and then [facility] garnishing the taxes.  Patient has been paranoid since then.  Dewayne was discussing this issue when the phone become disconnected.  Therapist attempted several phone calls back to Dewayne but could not reach him. No voicemail .   "

## 2022-03-08 NOTE — PLAN OF CARE
Goal Outcome Evaluation:  Plan of Care Reviewed With: patient  Patient Agreement with Plan of Care: agrees     Progress: improving  Outcome Evaluation: Horacio has slept approx 9 hours thus far, he denied SI HI and AVH.

## 2022-03-08 NOTE — PLAN OF CARE
Goal Outcome Evaluation:  Plan of Care Reviewed With: patient  Patient Agreement with Plan of Care: agrees     Progress: no change  Outcome Evaluation: Pt remains in bed resting after receiving ativan, haldol, et benadryl injections earlier this shift.

## 2022-03-08 NOTE — NURSING NOTE
Behavior   Note any precipitants to event or behavior   Describe level and action of any aggressive behavior or speech and associated interventions.     Anxiety: Patient denies at this time  Depression: Denies at this time  Pain  0  AVH   no  S/I   no  Plan  no  H/I   no  Plan  no    Affect   mood-congruent      Note:Patient reports anxiety 0 and depression 1/10. Denies SI HI and all AVH. Requested sleep med, Trazodone given. Sutures to lt arm are healing, slight swelling noted, no drainage. Vitals stable, mood is calm, he did have evening snack.      Intervention    PRN medication utilized:  yes - trazodone    Instructed in medication usage and effects yes  Medications administered as ordered yes  Encouraged to verbalize needs yes      Response    Verbalized understanding yes  Did patient take medications as ordered yes   Did patient interact with assessment?  yes     Plan    Will monitor for safety yes  Will monitor every 15 minutes as ordered yes  Will evaluate and promote the plan of care yes    Last BM:  unknown date  (Please chart in I/O as well)

## 2022-03-08 NOTE — NURSING NOTE
Behavior   Note any precipitants to event or behavior   Describe level and action of any aggressive behavior or speech and associated interventions.     Anxiety: Excess Worry, Restless/Edgy and Decreased sleep  Depression: fatigue and decreased appetite  Pain  0  AVH   no  S/I   no  Plan  no  H/I   no  Plan  no    Affect   euthymic/normal      Note: Pt is alert et denied SI, HI et hallucinations early this am. Pts dad called et asked to speak to pt. Dad given number to pt phone et instructed to call it after staff summoned him. Pt instructed to go to phone to wait until it rang. Pt waited approx 2 minutes without phone ringing. He was then instructed to call his dad, et attempted to do so but was not successful with dialing number. Female pt sitting behind him began to laugh at him. Pt then became irritated et began to yell. He came to area where staff was located et ripped off arm band et began to rant about pts laughing at him. Staff redirected him et went to the phone to dial his dads number. On the way to the phone, female pt apologized to him for laughing, with pt becoming more agitated. Once on the phone with his dad, pt began to yell, curse et demand that he be let out. Pt was heard telling his father that he is a piece of shit, et did not care about him or else he would not have placed him in here. Pt became louder et more angry et hung up on dad. He then walked to the corner area near geriatric doors et continued to yell about not wanting to be here. Security called but did not intervene. Pt allowed to vent et agreed to return to room. Once in room, pt expressed anger at being laughed at by other pt et of his previous instructions to give his dad the code on his bracelet. He agreed to take injections et to lay down. He asked what to expect from medications. He asked for food et was agreeable to taking injections. He apologized to this nurse et APRN for his behavior. Pt said that his anger was taken out on  "staff that did not deserve it. He apologized for his behavior et thanked staff for attempting to help him. He spoke of appearing \"crazy\" for his behaviors et said that he was regretful.   Pt began to cry before injections. He ate snack et is currently resting in bed. Will monitor.     Intervention    PRN medication utilized:  yes - Benadryl, Haldol, Ativan injections    Instructed in medication usage and effects  Medications administered as ordered  Encouraged to verbalize needs      Response    Verbalized understanding   Did patient take medications as ordered yes   Did patient interact with assessment?  yes     Plan    Will monitor for safety  Will monitor every 15 minutes as ordered  Will evaluate and promote the plan of care    Last BM:  unknown date  (Please chart in I/O as well)  "

## 2022-03-09 PROCEDURE — 99232 SBSQ HOSP IP/OBS MODERATE 35: CPT | Performed by: NURSE PRACTITIONER

## 2022-03-09 RX ADMIN — SERTRALINE 100 MG: 50 TABLET, FILM COATED ORAL at 08:10

## 2022-03-09 RX ADMIN — ARIPIPRAZOLE 20 MG: 10 TABLET ORAL at 21:06

## 2022-03-09 NOTE — PLAN OF CARE
Goal Outcome Evaluation:  Plan of Care Reviewed With: patient  Patient Agreement with Plan of Care: agrees     Progress: no change  Outcome Evaluation: Patient has been in room resting majority of the day. Sutures ALEXANDER- no signs of infection. No issues at this time.

## 2022-03-09 NOTE — NURSING NOTE
Behavior   Note any precipitants to event or behavior   Describe level and action of any aggressive behavior or speech and associated interventions.     Anxiety: Patient denies at this time  Depression: Patient denies at this time  Pain  0  AVH   no  S/I   no  Plan  no  H/I   no  Plan  no    Affect   blunted      Note: Horacio is sleeping in bed, he is drowsy from IM medicine today. He was complaint with evening medicine, and denied SI HI and all AVH. He refused snack and asked to go back to sleep. Stiches to lt arm are well approximated, open to air, no signs of infection, he also has scabbed cuts to the arm. Vital signs stable, continue to monitor.       Intervention    PRN medication utilized:  no    Instructed in medication usage and effects yes  Medications administered as ordered yes  Encouraged to verbalize needs yes      Response    Verbalized understanding yes  Did patient take medications as ordered yes   Did patient interact with assessment?  no    Plan    Will monitor for safety yes  Will monitor every 15 minutes as ordered  yes  Will evaluate and promote the plan of care yes    Last BM:  unknown date  (Please chart in I/O as well)

## 2022-03-09 NOTE — PROGRESS NOTES
LPCA met with patient in patient room and discussed patient after care. After talking with patient and weighing the pros and cons of outpatient medication management and therapy, patient was agreeable to let LPCA set up aftercare appointments for patient. LPCA will contact [facility] tomorrow to se up appointments. LPCA spoke to patient the importance of attending all groups and patient agreed to start participating more. Patient was cooperative and friendly with LPCA during interaction. LPCA agreed to talk with patient again one on one tomorrow.

## 2022-03-09 NOTE — PROGRESS NOTES
SW Intern spoke with patient's father, Dewayne. Father called back due to phone disconnection with Kim PEOPLES day before 3/8/22. Father stated that he will be picking up his son when discharged and that he is able to go back home to live. Father denies any access to firearms. Father stated that if patient has outpatient appointments, he will make sure he has a ride there. At this time, patient is refusing to do outpatient.

## 2022-03-09 NOTE — PLAN OF CARE
Goal Outcome Evaluation:  Plan of Care Reviewed With: patient  Patient Agreement with Plan of Care: agrees     Progress: improving  Outcome Evaluation: Pt has slept approx 8 hours thus far, uneventful evening.

## 2022-03-09 NOTE — NURSING NOTE
Behavior   Note any precipitants to event or behavior   Describe level and action of any aggressive behavior or speech and associated interventions.     Anxiety: Patient denies at this time  Depression: Patient denies at this time  Pain  0  AVH   no  S/I   no  Plan  no  H/I   no  Plan  no    Affect   blunted      Note: Patient in personal room during assessment. He appeared asleep and had to be woken up. Patient appears fidgety and states that he did not sleep last night. He denies SI/HI/AVH. Patient interacts minimally with assessment but is pleasant with signee. Compliant with meds.    Intervention    PRN medication utilized:  no    Instructed in medication usage and effects yes  Medications administered as ordered yes  Encouraged to verbalize needs yes      Response    Verbalized understanding yes  Did patient take medications as ordered yes   Did patient interact with assessment?  minimally    Plan    Will monitor for safety yes  Will monitor every 15 minutes as ordered  yes  Will evaluate and promote the plan of care yes    Last BM:  unknown date  (Please chart in I/O as well)

## 2022-03-09 NOTE — PROGRESS NOTES
3/9/2022    Chief Complaint: psychosis and anxiety    Subjective:  Patient is a 22 y.o. male that is currently inpatient on the adult U Today he is seen in his room, he is quiet and calm this morning, apologizes again for behavior yesterday.   Pt denies SI/HI/AVH, conversation was short, he states he doing well.    Pt is compliant with medications.      Objective     Vital Signs    Temp:  [97.4 °F (36.3 °C)-98.9 °F (37.2 °C)] 97.4 °F (36.3 °C)  Heart Rate:  [76-78] 76  Resp:  [16-20] 20  BP: ()/(53-58) 113/58    Physical Exam:   General Appearance: alert, appears stated age and cooperative,  Hygiene:   fair  Gait & Station: Normal  Musculoskeletal: No tremors or abnormal involuntary movements    Mental Status Exam:   Cooperation:  Cooperative  Eye Contact:  Fair  Psychomotor Behavior:  Appropriate  Mood: Improving  Affect:  normal  Speech:  Normal  Thought Process:  Coherent  Associations: Circumstantial  Thought Content:     Mood congruent   Suicidal:  None   Homicidal:  None   Hallucinations:  Not demonstrated today   Delusion:  None  Cognitive Functioning:   Consciousness: awake, alert and oriented  Reliability:  fair  Insight:  Poor  Judgement:  Impaired  Impulse Control:  improving    Lab Results (last 24 hours)     ** No results found for the last 24 hours. **        Imaging Results (Last 24 Hours)     ** No results found for the last 24 hours. **          Medicine:   Current Facility-Administered Medications:   •  acetaminophen (TYLENOL) tablet 650 mg, 650 mg, Oral, Q4H PRN, Lavelle Lisa MD  •  aluminum-magnesium hydroxide-simethicone (MAALOX MAX) 400-400-40 MG/5ML suspension 15 mL, 15 mL, Oral, Q6H PRN, Lavelle Lisa MD  •  [COMPLETED] ARIPiprazole (ABILIFY) tablet 2 mg, 2 mg, Oral, Daily, 2 mg at 03/06/22 1808 **FOLLOWED BY** ARIPiprazole (ABILIFY) tablet 20 mg, 20 mg, Oral, Nightly, Elizabeth Barbour APRN, 20 mg at 03/08/22 2025  •  cholecalciferol (VITAMIN D3) capsule 50,000 Units,  50,000 Units, Oral, Q7 Days, Lavelle Lisa MD, 50,000 Units at 03/07/22 2041  •  cloNIDine (CATAPRES) tablet 0.1 mg, 0.1 mg, Oral, Q6H PRN, Lavelle Lisa MD  •  hydrOXYzine pamoate (VISTARIL) capsule 50 mg, 50 mg, Oral, Q6H PRN, Lavelle Lisa MD  •  influenza vac split quad (FLUZONE,FLUARIX,AFLURIA,FLULAVAL) injection 0.5 mL, 0.5 mL, Intramuscular, During Hospitalization, Lavelle Lisa MD  •  loperamide (IMODIUM) capsule 2 mg, 2 mg, Oral, Q2H PRN, Lavelle Lisa MD  •  magnesium hydroxide (MILK OF MAGNESIA) suspension 10 mL, 10 mL, Oral, Daily PRN, Lavelle Lisa MD  •  nicotine polacrilex (NICORETTE) gum 2 mg, 2 mg, Mouth/Throat, Q1H PRN, Lavelle Lisa MD  •  [COMPLETED] sertraline (ZOLOFT) tablet 25 mg, 25 mg, Oral, Daily, 25 mg at 03/06/22 1809 **FOLLOWED BY** sertraline (ZOLOFT) tablet 100 mg, 100 mg, Oral, Daily, Elizabeth Barbour, APRN, 100 mg at 03/09/22 0810  •  traZODone (DESYREL) tablet 50 mg, 50 mg, Oral, Nightly PRN, Lavelle Lisa MD, 50 mg at 03/07/22 2008    Diagnoses/Assessment:     Depression    Self-injurious behavior    Borderline personality disorder (HCC)    Affective disorder (HCC)    Post traumatic stress disorder (PTSD)    Paranoia (HCC)      Treatment Plan:    1) Will continue care for the patient on the behavioral health unit at [facility] to ensure patient safety.  2) Will continue to provide treatment with the unit milieu, activities, therapies and psychopharmacological management.  3) Patient to be placed on or continued on  Q15 minute checks  and Elopement and Aggression precautions.  4) Pertinent medical issues: none  5) Will order following labs: none  6) Will make the following medication changes:   --Cont Zoloft 100 mg daily  --Cont Abilify 20 mg QHS  7) Will continue discharge planning as appropriate for patient.  8) Psychotherapy provided for less than 16 minutes.    Treatment plan and medication risks and benefits  discussed with: Patient    Elizabeth Barbour, DELANO  03/09/22  14:10 CST

## 2022-03-10 PROCEDURE — 99232 SBSQ HOSP IP/OBS MODERATE 35: CPT | Performed by: PSYCHIATRY & NEUROLOGY

## 2022-03-10 RX ADMIN — SERTRALINE 100 MG: 50 TABLET, FILM COATED ORAL at 08:07

## 2022-03-10 RX ADMIN — ARIPIPRAZOLE 20 MG: 10 TABLET ORAL at 20:20

## 2022-03-10 NOTE — CONSULTS
Adult Nutrition  Assessment    Patient Name:  Horacio Puckett  YOB: 2000  MRN: [id number]  Admit Date:  3/6/2022    Assessment Date:  3/10/2022    Comments:  RD f/u. Pt eating 100% of regular diet. Pt did not wake up to speak to RD or attend healthy eating group. Will cont to monitor.      Reason for Assessment     Row Name 03/10/22 1418          Reason for Assessment    Reason For Assessment follow-up protocol                          Nutrition Prescription Ordered     Row Name 03/10/22 1419          Nutrition Prescription PO    Current PO Diet Regular     Fluid Consistency Thin                Evaluation of Received Nutrient/Fluid Intake     Row Name 03/10/22 1419          PO Evaluation    % PO Intake 100                     Electronically signed by:  Jennifer Powell RD  03/10/22 14:20 CST

## 2022-03-10 NOTE — PLAN OF CARE
Goal Outcome Evaluation:  Plan of Care Reviewed With: patient  Patient Agreement with Plan of Care: agrees     Progress: improving  Outcome Evaluation: Patient has been attending group therapy. Compliant with meds. No issues at this time.

## 2022-03-10 NOTE — NURSING NOTE
"Behavior   Note any precipitants to event or behavior   Describe level and action of any aggressive behavior or speech and associated interventions.     Anxiety: Excess Worry  Depression: depressed mood  Pain  0  AVH   no  S/I   no  Plan  no  H/I   no  Plan  no    Affect   mood-congruent      Note: Pt in room lying across bed, answered quickly after name called. Pt stated that he went to one of the groups during the day, but had planned on going to more but still \"felt sleepy from the medicine and trying to get used to it.\" Asked if he felt like he was avoiding d/t anxiety, he stated that he was not. We discussed his feelings about the depression, he stated, \" I think I get in my own head too much.\" He went onto say that he felt God was ashamed of him. Discussed where some of those beliefs came from, acknowledged his grandmother's Hurtful comments and its affect upon him. Pt reports no problems with eating, drinking or elimination.      Intervention    PRN medication utilized:  no    Instructed in medication usage and effects  Medications administered as ordered  Encouraged to verbalize needs      Response    Verbalized understanding   Did patient take medications as ordered yes   Did patient interact with assessment?  yes     Plan    Will monitor for safety  Will monitor every 15 minutes as ordered  Will evaluate and promote the plan of care    Last BM:  03/05/22  (Please chart in I/O as well)  "

## 2022-03-10 NOTE — PLAN OF CARE
Goal Outcome Evaluation:  Plan of Care Reviewed With: patient  Patient Agreement with Plan of Care: agrees     Progress: improving  Outcome Evaluation: Pt has rested nearly 6 hours to this point in the shift. No PRN's requested at bedtime, no needs voiced overnight. At bedtime, sutures are intact with no s/s of infection. Pt's only complaint was itching around the area. No new behavioral issues. Continue to monitor for patient safety.

## 2022-03-10 NOTE — PROGRESS NOTES
[facility] Patient came to [facility]'s group today and participated and [facility] patient for his participation. Patient was in his room and not participating in the recreation group. Patient reports that he wanted to rest because he is stressed. Patient repots having court soon for a trafficking charge and that it is making him anxious. [facility] requested patient continue to participate in groups. Patient wanted to know about possible discharge today but [facility] reported that discharge plans have not yet been decided by the treatment team and reminded patient that it will depend on him and his behaviors during his stay at the hospital. Patient agreed to be cooperative.

## 2022-03-10 NOTE — NURSING NOTE
Behavior   Note any precipitants to event or behavior   Describe level and action of any aggressive behavior or speech and associated interventions.     Anxiety: Patient denies at this time  Depression: Patient denies at this time  Pain  0  AVH   no  S/I   no  Plan  no  H/I   no  Plan  no    Affect   flat      Note: Patient in personal room during assessment. He appeared asleep and had to be woken up. He denies SI/HI/AVH. Patient interacts minimally with assessment but is pleasant with signee. Compliant with meds.    Intervention    PRN medication utilized:  no    Instructed in medication usage and effects yes  Medications administered as ordered yes  Encouraged to verbalize needs yes      Response    Verbalized understanding yes  Did patient take medications as ordered yes   Did patient interact with assessment?  minimally    Plan    Will monitor for safety yes  Will monitor every 15 minutes as ordered  yes  Will evaluate and promote the plan of care yes    Last BM:  unknown date  (Please chart in I/O as well)

## 2022-03-11 VITALS
OXYGEN SATURATION: 97 % | HEIGHT: 67 IN | SYSTOLIC BLOOD PRESSURE: 125 MMHG | RESPIRATION RATE: 18 BRPM | HEART RATE: 81 BPM | TEMPERATURE: 96.7 F | DIASTOLIC BLOOD PRESSURE: 72 MMHG | BODY MASS INDEX: 25.11 KG/M2 | WEIGHT: 160 LBS

## 2022-03-11 PROBLEM — F15.90 STIMULANT USE DISORDER: Status: ACTIVE | Noted: 2022-03-11

## 2022-03-11 PROBLEM — F12.10 CANNABIS ABUSE: Status: ACTIVE | Noted: 2022-03-11

## 2022-03-11 PROBLEM — F15.90 STIMULANT USE DISORDER: Status: RESOLVED | Noted: 2022-03-11 | Resolved: 2022-03-11

## 2022-03-11 PROBLEM — F22 PARANOIA: Status: RESOLVED | Noted: 2022-03-07 | Resolved: 2022-03-11

## 2022-03-11 PROBLEM — E55.9 HYPOVITAMINOSIS D: Status: ACTIVE | Noted: 2022-03-11

## 2022-03-11 PROBLEM — F39 AFFECTIVE DISORDER: Status: RESOLVED | Noted: 2022-03-06 | Resolved: 2022-03-11

## 2022-03-11 PROCEDURE — 99239 HOSP IP/OBS DSCHRG MGMT >30: CPT | Performed by: PSYCHIATRY & NEUROLOGY

## 2022-03-11 RX ORDER — ARIPIPRAZOLE 20 MG/1
20 TABLET ORAL NIGHTLY
Qty: 30 TABLET | Refills: 1 | Status: SHIPPED | OUTPATIENT
Start: 2022-03-11

## 2022-03-11 RX ORDER — SERTRALINE HYDROCHLORIDE 100 MG/1
100 TABLET, FILM COATED ORAL DAILY
Qty: 30 TABLET | Refills: 1 | Status: SHIPPED | OUTPATIENT
Start: 2022-03-12

## 2022-03-11 RX ADMIN — SERTRALINE 100 MG: 50 TABLET, FILM COATED ORAL at 08:09
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
## 2022-03-11 NOTE — NURSING NOTE
Behavior   Note any precipitants to event or behavior   Describe level and action of any aggressive behavior or speech and associated interventions.     Anxiety: Patient denies at this time  Depression: Pt denies at this time  Pain  0  AVH   no  S/I   no  Plan  no  H/I   no  Plan  no    Affect   euthymic/normal      Note:  Pt seen in personal room after breakfast.  Pt ate well.  Pt is alert and oriented x 4, calm and cooperative.  Pt denies any problems at this time and verbalized sleeping well.      Intervention    PRN medication utilized:  no    Instructed in medication usage and effects  Medications administered as ordered  Encouraged to verbalize needs      Response    Verbalized understanding   Did patient take medications as ordered yes   Did patient interact with assessment?  yes     Plan    Will monitor for safety  Will monitor every 15 minutes as ordered  Will evaluate and promote the plan of care    Last BM:  unknown date  (Please chart in I/O as well)

## 2022-03-11 NOTE — NURSING NOTE
Pt discharged to friend/family member for transport to home, via car.  Pt received all discharge paperwork, has confirmed pharmacy for medication , and verbalized understanding for follow up discharges.  Pt is alert and oriented x 4, calm and cooperative, has denied any problems including pain and does not appear to be in any distress.  Pt is dressed appropriately for discharge.

## 2022-03-11 NOTE — PLAN OF CARE
Goal Outcome Evaluation:  Plan of Care Reviewed With: patient  Patient Agreement with Plan of Care: agrees     Progress: no change  Outcome Evaluation: pt awaiting transport for discharge.

## 2022-03-11 NOTE — NURSING NOTE
"Behavior   Note any precipitants to event or behavior   Describe level and action of any aggressive behavior or speech and associated interventions.     Anxiety: Easily fatigued  Depression: Patient denies at this time  Pain  0  AVH   no  S/I   no  Plan  no  H/I   no  Plan  no    Affect   mood-congruent      Note: Pt is in room resting, answered immediately when name was called. Pt stated that he had a good day and that he was able to speak with his dad. He stated that his only anxiety was related to being  from him. He did not give a number for his anxiety, he denies feeling depressed, or SI, HI, AVH. He stated that he went to group once today and that it was exceptionally long. However, he stated that he did enjoy attending. Asked regarding his medications and if he was adjusting to them. He stated that he is \"doing okay with the abilify, but feels that zoloft makes him feel tingly and that his eyes feel big.\"       Intervention    PRN medication utilized:  no    Instructed in medication usage and effects  Medications administered as ordered  Encouraged to verbalize needs      Response    Verbalized understanding   Did patient take medications as ordered yes  Did patient interact with assessment?  yes     Plan    Will monitor for safety  Will monitor every 15 minutes as ordered  Will evaluate and promote the plan of care    Last BM:  03/10/22  (Please chart in I/O as well)  "

## 2022-03-11 NOTE — PROGRESS NOTES
3/10/2022    Chief Complaint: psychosis    Subjective:  Patient is a 22 y.o. male that is currently inpatient on the adult U  Today he is seen in his room, he is laying in bed He states he is ready to discharge although he has not been up interacting with peers or attending groups.  Pt is appropriate with behaviors, he has been compliant with medications.  He does go to groups once encouraged  Pt denies SI/HI/AVH    Objective     Vital Signs    Temp:  [98 °F (36.7 °C)] 98 °F (36.7 °C)  Heart Rate:  [60] 60  Resp:  [16] 16  BP: (121)/(55) 121/55    Physical Exam:   General Appearance: alert, appears stated age and cooperative,  Hygiene:   fair  Gait & Station: Normal  Musculoskeletal: No tremors or abnormal involuntary movements    Mental Status Exam:   Cooperation:  Cooperative  Eye Contact:  Fair  Psychomotor Behavior:  Appropriate  Mood: Improving  Affect:  mood-congruent  Speech:  Normal  Thought Process:  Coherent  Associations: Goal Directed  Thought Content:     Mood congruent   Suicidal:  None   Homicidal:  None   Hallucinations:  Not demonstrated today   Delusion:  None  Cognitive Functioning:   Consciousness: awake, alert and oriented  Reliability:  fair  Insight:  Fair  Judgement:  Fair  Impulse Control:  improving    Lab Results (last 24 hours)     ** No results found for the last 24 hours. **        Imaging Results (Last 24 Hours)     ** No results found for the last 24 hours. **          Medicine:   Current Facility-Administered Medications:   •  acetaminophen (TYLENOL) tablet 650 mg, 650 mg, Oral, Q4H PRN, Lavelle Lisa MD  •  aluminum-magnesium hydroxide-simethicone (MAALOX MAX) 400-400-40 MG/5ML suspension 15 mL, 15 mL, Oral, Q6H PRN, Lavelle Lisa MD  •  [COMPLETED] ARIPiprazole (ABILIFY) tablet 2 mg, 2 mg, Oral, Daily, 2 mg at 03/06/22 1808 **FOLLOWED BY** ARIPiprazole (ABILIFY) tablet 20 mg, 20 mg, Oral, Nightly, Elizabeth Barbour APRN, 20 mg at 03/09/22 2106  •  cholecalciferol  (VITAMIN D3) capsule 50,000 Units, 50,000 Units, Oral, Q7 Days, Lavelle Lisa MD, 50,000 Units at 03/07/22 2041  •  cloNIDine (CATAPRES) tablet 0.1 mg, 0.1 mg, Oral, Q6H PRN, Lavelle Lisa MD  •  hydrOXYzine pamoate (VISTARIL) capsule 50 mg, 50 mg, Oral, Q6H PRN, Lavelle Lisa MD  •  influenza vac split quad (FLUZONE,FLUARIX,AFLURIA,FLULAVAL) injection 0.5 mL, 0.5 mL, Intramuscular, During Hospitalization, Lavelle Lisa MD  •  loperamide (IMODIUM) capsule 2 mg, 2 mg, Oral, Q2H PRN, Lavelle Lisa MD  •  magnesium hydroxide (MILK OF MAGNESIA) suspension 10 mL, 10 mL, Oral, Daily PRN, Lavelle Lisa MD  •  nicotine polacrilex (NICORETTE) gum 2 mg, 2 mg, Mouth/Throat, Q1H PRN, Lavelle Lisa MD  •  [COMPLETED] sertraline (ZOLOFT) tablet 25 mg, 25 mg, Oral, Daily, 25 mg at 03/06/22 1809 **FOLLOWED BY** sertraline (ZOLOFT) tablet 100 mg, 100 mg, Oral, Daily, Elizabeth Barbour, APRN, 100 mg at 03/10/22 0807  •  traZODone (DESYREL) tablet 50 mg, 50 mg, Oral, Nightly PRN, Lavelle Lisa MD, 50 mg at 03/07/22 2008    Diagnoses/Assessment:     Paranoia (HCC)    Depression    Self-injurious behavior    Borderline personality disorder (HCC)    Affective disorder (HCC)    Post traumatic stress disorder (PTSD)      Treatment Plan:    1) Will continue care for the patient on the behavioral health unit at [facility] to ensure patient safety.  2) Will continue to provide treatment with the unit milieu, activities, therapies and psychopharmacological management.  3) Patient to be placed on or continued on  Q15 minute checks  and Suicide precautions.  4) Pertinent medical issues: none  5) Will order following labs: none  6) Will make the following medication changes:   --Cont Zoloft 100 mg daily  --Cont Abilify 20 mg QHS  7) Will continue discharge planning as appropriate for patient.  8) Psychotherapy provided for less than 16 minutes.    Treatment plan and medication risks  and benefits discussed with: Patient    Elizabeth Barbour, APRN  03/10/22  19:39 CST

## 2022-03-11 NOTE — DISCHARGE INSTRUCTIONS
--Ensure that all mediations (including over the counter meds) are secured in a lock box and that you use a weekly pill planner.    --Ensure all weapons if present (including firearms) are secured (ideally in a gun safe or removed from home) and all ammo is secured and stored separately.    --Continue medications as currently prescribed.  --Continue follow-up with outpatient providers.  --Please contact our Behavioral Health Unit with any questions or concerns at [phone].  --Return to the ER with any suicidal or homicidal ideation, or worsening symptoms.    --The number for the National Suicide & Crisis Hotline is 1-933.539.2252.  The National Crisis Text number is 018-413.  These may be contacted at any time, if needed.

## 2022-03-11 NOTE — PLAN OF CARE
Goal Outcome Evaluation:  Plan of Care Reviewed With: patient  Patient Agreement with Plan of Care: agrees     Progress: improving  Outcome Evaluation: Pt has rested well, he has slept approximately 7 1/2 hours to this point. No new behavioral issues overnight. Continue to monitor patient safety.

## 2022-03-11 NOTE — PLAN OF CARE
Problem: Adult Behavioral Health Plan of Care  Goal: Develops/Participates in Therapeutic [place] to Support Successful Transition  Outcome: Ongoing, Progressing  Intervention: Mutually Develop Transition Plan  Flowsheets (Taken 3/7/2022 1305)  Transition Support:  • community resources reviewed  • follow-up care coordinated  • follow-up care discussed  • crisis management plan promoted  • crisis management plan verbalized        1010:    Covering therapist met with patient 1-1 this date.  Patient oriented x 4 and calm. Patient denies SI/HI/AVH. He rates depression/anxiety at 0/10. Patient appears future oriented. He reports that medicine changes and going to group has helped. Patient lists talking to others about your feelings as a healthy coping skill. Patient plans to return home with his father Dewayne at [phone];  Staffed case with Dr. Gutierrez who plans to conduct safety planning with patient's father this date.     Patient is agreeable to a [facility] referral and was able to verbalized the importance of attending and medication compliance.     Assisted patient in identifying risk factors which would indicate the need for higher level of care including thoughts to harm self or others and/or self-harming behavior and encouraged patient to call 911, or present to the nearest emergency room should any of these events occur. Discussed crisis intervention services and means to access.  Patient adamantly and convincingly denies current suicidal or homicidal ideation or perceptual disturbance.

## 2022-03-13 LAB
QT INTERVAL: 338 MS
QTC INTERVAL: 415 MS